# Patient Record
Sex: MALE | Race: WHITE | NOT HISPANIC OR LATINO | Employment: STUDENT | URBAN - METROPOLITAN AREA
[De-identification: names, ages, dates, MRNs, and addresses within clinical notes are randomized per-mention and may not be internally consistent; named-entity substitution may affect disease eponyms.]

---

## 2018-02-13 ENCOUNTER — OFFICE VISIT (OUTPATIENT)
Dept: ORTHOPEDIC SURGERY | Facility: CLINIC | Age: 23
End: 2018-02-13

## 2018-02-13 VITALS — WEIGHT: 171.2 LBS | HEIGHT: 71 IN | TEMPERATURE: 99.6 F | BODY MASS INDEX: 23.97 KG/M2

## 2018-02-13 DIAGNOSIS — M25.561 PAIN AND SWELLING OF KNEE, RIGHT: Primary | ICD-10-CM

## 2018-02-13 DIAGNOSIS — M25.461 PAIN AND SWELLING OF KNEE, RIGHT: Primary | ICD-10-CM

## 2018-02-13 PROCEDURE — 73562 X-RAY EXAM OF KNEE 3: CPT | Performed by: ORTHOPAEDIC SURGERY

## 2018-02-13 PROCEDURE — 99203 OFFICE O/P NEW LOW 30 MIN: CPT | Performed by: ORTHOPAEDIC SURGERY

## 2018-02-13 NOTE — PROGRESS NOTES
New Right knee      Patient: Alphonse Hirsch        YOB: 1995    Medical Record Number: [id number]        Chief Complaints: Right knee pain      History of Present Illness: This is a  23 y.o. male who presents complaining of some right knee pain.  His ACL reconstructed in 2015 subsequent scope in 2016 for a cyclops lesion he had this done in [facility] where he was living in working at the time.  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     No current facility-administered medications on file prior to visit.      Current Medications:  Scheduled Meds:  Continuous Infusions:  No current facility-administered medications for this visit.   PRN Meds:.    Past Medical History:   Diagnosis Date   • Fracture, jaw         Past Surgical History:   Procedure Laterality Date   • KNEE ACL RECONSTRUCTION Right 07/2015   • KNEE MENISCAL REPAIR Right 09/2016        Social History     Occupational History   • Not on file.     Social History Main Topics   • Smoking status: Never Smoker   • Smokeless tobacco: Never Used   • Alcohol use Yes      Comment: 1 per week   • Drug use: No   • Sexual activity: Defer    Social History     Social History Narrative   • No narrative on file        Family History   Problem Relation Age of Onset   • Family history unknown: Yes             Review of Systems: 14 point review of systems are remarkable for the pertinent positives listed in the chart by the patient the remainder are negative    Review of Systems      Physical Exam: 23 y.o. male  General Appearance:    " Alert, cooperative, in no acute distress                 Vitals:    02/13/18 1607   Temp: 99.6 °F (37.6 °C)   TempSrc: Temporal Artery    Weight: 77.7 kg (171 lb 3.2 oz)   Height: 180.3 cm (71\")      Patient is alert and read ×3 no acute distress appears her above-listed at height weight and age.  Affect is normal respiratory rate is normal unlabored. Heart rate regular rate rhythm, sclera, dentition and hearing are normal for the purpose of this exam.        Ortho Exam Physical exam of the right knee reveals no effusion, no erythema.  The patient has no palpable tenderness along the medial joint line, no tenderness about the lateral joint line.  Patient does have crepitus with patellofemoral range of motion.  They also have subjective symptoms anteriorly during exam.  The patient has a negative bounce home, negative Kim and a stable ligamentous exam.  Quad tone is reasonable and symmetric.  There are no overlying skin changes no lymphedema no lymphadenopathy.  There is good hip range of motion which is full symmetric and asymptomatic and a normal ankle exam.  Hamstrings and IT band are tight bilaterally.  He does have a healed surgical incision from an apparent quadriceps tendon graft harvest      Procedures             Radiology:   AP, Lateral and merchant views of the right knee  were ordered/reviewed to evauateknee pain.  I've no comparative films these show evidence of prior ACL reconstruction with an Endobutton  He states he's had a recent MRI however we do not have access to those he is going to try to get those to me.  He states they were pretty normal no evidence of any acute new pathology    Assessment/Plan:    Right knee pain I suspect this is more due to some tightness around his quads and his hamstrings perhaps even IT band which we talked about he is going to work on these things rest ice anti-inflammatories he is also going to try to give me his MRI he fails to improve with these we could " consider other means of testing

## 2022-06-23 ENCOUNTER — OFFICE VISIT (OUTPATIENT)
Dept: ORTHOPEDIC SURGERY | Facility: CLINIC | Age: 27
End: 2022-06-23

## 2022-06-23 VITALS — BODY MASS INDEX: 24.34 KG/M2 | TEMPERATURE: 97.3 F | HEIGHT: 70 IN | WEIGHT: 170 LBS

## 2022-06-23 DIAGNOSIS — S83.241A OTHER TEAR OF MEDIAL MENISCUS OF RIGHT KNEE AS CURRENT INJURY, INITIAL ENCOUNTER: ICD-10-CM

## 2022-06-23 DIAGNOSIS — M25.561 RIGHT KNEE PAIN, UNSPECIFIED CHRONICITY: Primary | ICD-10-CM

## 2022-06-23 PROCEDURE — 73562 X-RAY EXAM OF KNEE 3: CPT | Performed by: ORTHOPAEDIC SURGERY

## 2022-06-23 PROCEDURE — 99204 OFFICE O/P NEW MOD 45 MIN: CPT | Performed by: ORTHOPAEDIC SURGERY

## 2022-06-23 RX ORDER — MELOXICAM 15 MG/1
TABLET ORAL
Qty: 30 TABLET | Refills: 0 | Status: SHIPPED | OUTPATIENT
Start: 2022-06-23

## 2022-06-23 NOTE — PROGRESS NOTES
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     No current facility-administered medications on file prior to visit.     Current Medications:  Scheduled Meds:  Continuous Infusions:No current facility-administered medications for this visit.    PRN Meds:.    Past Medical History:   Diagnosis Date   • Fracture, jaw (HCC)         Past Surgical History:   Procedure Laterality Date   • KNEE ACL RECONSTRUCTION Right 07/2015   • KNEE MENISCAL REPAIR Right 09/2016        Social History     Occupational History   • Not on file   Tobacco Use   • Smoking status: Never Smoker   • Smokeless tobacco: Never Used   Substance and Sexual Activity   • Alcohol use: Yes     Comment: 1 per week   • Drug use: No   • Sexual activity: Defer      Social History     Social History Narrative   • Not on file        Family History   Family history unknown: Yes             Review of Systems:     Review of Systems      Physical Exam: 27 y.o. male  General Appearance:    Alert, cooperative, in no acute  "distress                   Vitals:    06/23/22 1058   Temp: 97.3 °F (36.3 °C)   Weight: 77.1 kg (170 lb)   Height: 177.8 cm (70\")   PainSc: 0-No pain   PainLoc: Knee      Patient is alert and read ×3 no acute distress appears her above-listed at height weight and age.  Affect is normal respiratory rate is normal unlabored. Heart rate regular rate rhythm, sclera, dentition and hearing are normal for the purpose of this exam.        Ortho Exam exam of his knee has healed surgical incisions from a quad tendon ACL he has a stable exam good range of motion he does have a mild to moderate effusion    Procedures             Radiology:   AP, Lateral and merchant views of the right knee  were ordered/reviewed to evauateknee pain.  I have compared x-rays 2018 he has good maintenance of his joint space x3 does have evidence of hardware removal on the tibia on the right  Imaging Results (Most Recent)     Procedure Component Value Units Date/Time    XR Knee 3 View Right [[id number]] Resulted: 06/23/22 1033     Updated: 06/23/22 1033    Impression:      Ordering physician's impression is located in the Encounter Note dated 06/23/22. X-ray performed in the DR room.          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

## 2022-06-30 ENCOUNTER — TELEPHONE (OUTPATIENT)
Dept: ORTHOPEDIC SURGERY | Facility: CLINIC | Age: 27
End: 2022-06-30

## 2022-07-07 ENCOUNTER — TELEPHONE (OUTPATIENT)
Dept: ORTHOPEDIC SURGERY | Facility: CLINIC | Age: 27
End: 2022-07-07

## 2022-07-07 NOTE — TELEPHONE ENCOUNTER
Caller:  PATIENT    Relationship to patient: SELF     Best call back number: [phone]    Patient is needing: PATIENT WAS RETURNING [facility]  CALL TO GO OVER HIS MRI RESULTS. PATIENT DID SAY HE WILL BE AT WORK AND NOT SURE WHEN HE WILL BE FREE TO TALK BUT STATED YOU CAN CALL TOMORROW AFTER 2 PM IF THAT WORK'S FOR [facility]. I TRIED TO WARM TRANSFER CALL TO THE OFFICE BUT RECEIVED NO ANSWER. THANK YOU!

## 2022-07-08 ENCOUNTER — TELEPHONE (OUTPATIENT)
Dept: ORTHOPEDIC SURGERY | Facility: CLINIC | Age: 27
End: 2022-07-08

## 2022-07-08 NOTE — TELEPHONE ENCOUNTER
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

## 2022-07-08 NOTE — TELEPHONE ENCOUNTER
Caller: Alphonse Hirsch    Relationship to patient: Self    Best call back number: [phone]    Type of visit: TELEPHONE VISIT    Requested date: ASAP    Additional notes: PATIENT STATES HE IS DOING AN INTERNSHIP IN [place] AND WILL NOT BE BACK IN [place] UNTIL LATE September. HE IS ASKING IF HE CAN SCHEDULE A TELEPHONE FOLLOW UP TO GO OVER HIS MRI.

## 2022-07-08 NOTE — TELEPHONE ENCOUNTER
We played phone tag several times.  Ultimately, I looked on his verbal release where he allowed for leaving  Results on machine, so I did.  I advised for him to call back for follow up appt.  So, this is his request.  Please advise how to proceed

## 2022-07-13 ENCOUNTER — DOCUMENTATION (OUTPATIENT)
Dept: ORTHOPEDIC SURGERY | Facility: CLINIC | Age: 27
End: 2022-07-13